# Patient Record
Sex: MALE | Race: WHITE | URBAN - METROPOLITAN AREA
[De-identification: names, ages, dates, MRNs, and addresses within clinical notes are randomized per-mention and may not be internally consistent; named-entity substitution may affect disease eponyms.]

---

## 2018-11-09 ENCOUNTER — OFFICE VISIT (OUTPATIENT)
Dept: INTERNAL MEDICINE CLINIC | Age: 48
End: 2018-11-09

## 2018-11-09 VITALS
HEIGHT: 66 IN | RESPIRATION RATE: 16 BRPM | DIASTOLIC BLOOD PRESSURE: 70 MMHG | SYSTOLIC BLOOD PRESSURE: 112 MMHG | HEART RATE: 91 BPM | WEIGHT: 154 LBS | OXYGEN SATURATION: 98 % | TEMPERATURE: 98 F | BODY MASS INDEX: 24.75 KG/M2

## 2018-11-09 DIAGNOSIS — Z00.00 PHYSICAL EXAM, ANNUAL: Primary | ICD-10-CM

## 2018-11-09 DIAGNOSIS — G56.01 CARPAL TUNNEL SYNDROME OF RIGHT WRIST: ICD-10-CM

## 2018-11-09 DIAGNOSIS — E78.2 MIXED HYPERLIPIDEMIA: ICD-10-CM

## 2018-11-09 DIAGNOSIS — Z23 ENCOUNTER FOR IMMUNIZATION: ICD-10-CM

## 2018-11-09 NOTE — PROGRESS NOTES
HISTORY OF PRESENT ILLNESS Mateo Yeager is a 56 y.o. male. HPI Pt is here to establish care. BP is 112/70 Wt today is 154 lbs Pt states that he works out daily (lifting weights and running) and eats well He does not drink soft drinks, just orange juice in the AM 
Usually eats home cooked meals, and tries to eat well whenever he goes out to eat His wt is in the nl ranges Ordered labs Pt follows with Dr Benita Weber (derm) Last visit was  He has had noncancerous moles taken off in the past 
 
Pt takes a daily multivitamin Pt c/o sweating a lot for quite some time He wonders if this is from drinking too much coffee Pt has used CertainDri but this burns his arms and leaves red marks Discussed that some people sweat more than others Discussed that he could discuss this with a derm Sx get worse with stress, discussed that this is nl Will check labs Pt c/o his R hand feeling like it is going to fall asleep This especially happens when he is typing He also feels this when he wakes up in the AM 
Discussed that this is likely a form of carpal tunnel Discussed using a wrist splint, especially at night Discussed not carrying a heavy bag over his R shoulder PMHx: High cholesterol FMHx: Brother - squamous cell skin cancer Mother - lung cancer (smoker), MS (possibly misdiagnosed) Father - prostate cancer (mid 62s), basal cell, high cholesterol Grandfather - stroke, heart attack ([de-identified], smoker) Grandmother - stroke ([de-identified], smoker) PSHx: bunionectomy Inguinal hernia repair Typanostomy Lynch teeth removal 
 
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 
Colonoscopy: not yet needed PSA: fmhx --father Tdap: 18 Pneumovax: not yet needed Shingrix: not yet needed Flu shot: 2018 Eye exam: [facility] in [place], early , every other year Lipids: ordered EK18, nsr 
 
 There are no active problems to display for this patient. Past Surgical History:  
Procedure Laterality Date  HX BUNIONECTOMY  HX HERNIA REPAIR    
 HX TYMPANOSTOMY  HX WISDOM TEETH EXTRACTION No results found for: WBC, WBCT, WBCPOC, HGB, HGBPOC, HCT, HCTPOC, PLT, PLTPOC, MCV, MCVPOC, HGBEXT, HCTEXT, PLTEXT No results found for: CHOL, CHOLPOCT, HDL, LDL, LDLC, LDLCPOC, LDLCEXT, TRIGL, TGLPOCT, CHHD, CHHDX No results found for: GFRNA, GFRNAPOC, GFRAA, GFRAAPOC, CREA, CREAPOC, BUN, IBUN, BUNPOC, NA, NAPOC, K, KPOCT, CL, CLPOC, CO2, CO2POC, MG, PHOS, ALBEU, PTH, PTHILT, EPO Review of Systems Constitutional: Negative for chills and fever. HENT: Negative for hearing loss and tinnitus. Eyes: Negative for blurred vision and double vision. Respiratory: Negative for shortness of breath and wheezing. Cardiovascular: Negative for chest pain and palpitations. Gastrointestinal: Negative for nausea and vomiting. Genitourinary: Negative for dysuria and frequency. Musculoskeletal: Negative for back pain and falls. Skin: Negative for itching and rash. Neurological: Negative for dizziness, loss of consciousness and headaches. Psychiatric/Behavioral: Negative for depression. The patient is not nervous/anxious. Physical Exam  
Constitutional: He is oriented to person, place, and time. He appears well-developed and well-nourished. No distress. HENT:  
Head: Normocephalic and atraumatic. Right Ear: External ear normal.  
Left Ear: External ear normal.  
Mouth/Throat: Oropharynx is clear and moist. No oropharyngeal exudate. Old scarring in L ear Eyes: Conjunctivae and EOM are normal. Pupils are equal, round, and reactive to light. Right eye exhibits no discharge. Left eye exhibits no discharge. No scleral icterus. Neck: Normal range of motion. Neck supple. No carotid bruits Cardiovascular: Normal rate, regular rhythm, normal heart sounds and intact distal pulses. Exam reveals no gallop and no friction rub. No murmur heard. Pulmonary/Chest: Effort normal and breath sounds normal. No respiratory distress. He has no wheezes. He has no rales. He exhibits no tenderness. Abdominal: Soft. He exhibits no distension and no mass. There is no tenderness. There is no rebound and no guarding. Musculoskeletal: Normal range of motion. He exhibits no edema, tenderness or deformity. Lymphadenopathy:  
  He has no cervical adenopathy. Neurological: He is alert and oriented to person, place, and time. He has normal reflexes. Coordination normal.  
Skin: Skin is warm and dry. No rash noted. He is not diaphoretic. No erythema. No pallor. Psychiatric: He has a normal mood and affect. His behavior is normal.  
 
 
ASSESSMENT and PLAN 
  ICD-10-CM ICD-9-CM 1. Mixed hyperlipidemia Will check lipids today and treat as indicated, pt has no risk factors for CAD at this time E78.2 272.2 2. Physical exam, annual 
 
Nl wt, nl BP, flu shot already done, tdap today, EKG nsr, labs ordered, eye exam UTD, discussed sweating, will check metabolic labs Z00.00 V70.0 LIPID PANEL  
   METABOLIC PANEL, COMPREHENSIVE  
   CBC WITH AUTOMATED DIFF  
   TSH 3RD GENERATION  
   HEMOGLOBIN A1C WITH EAG  
   PSA, DIAGNOSTIC (PROSTATE SPECIFIC AG) EKG, 12 LEAD, INITIAL 3. Carpal tunnel syndrome of right wrist 
 
Provided wrist splint for R hand G56.01 354.0 AMB SUPPLY ORDER Depression screen reviewed and negative. Scribed by Royal Valadez of [address] [place] [address] [address], as dictated by Dr. Leslie Baugh. Current diagnosis and concerns discussed with pt at length. Pt understands risks and benefits or current treatment plan and medications, and accepts the treatment and medication with any possible risks. Pt asks appropriate questions, which were answered. Pt was instructed to call with any concerns or problems. I have reviewed the note documented by the scribe. The services provided are my own. The documentation is accurate This note will not be viewable in [facility].

## 2019-01-09 ENCOUNTER — APPOINTMENT (OUTPATIENT)
Dept: INTERNAL MEDICINE CLINIC | Age: 49
End: 2019-01-09

## 2019-01-10 LAB
ALBUMIN SERPL-MCNC: 4.5 G/DL (ref 3.5–5.5)
ALBUMIN/GLOB SERPL: 1.9 {RATIO} (ref 1.2–2.2)
ALP SERPL-CCNC: 84 IU/L (ref 39–117)
ALT SERPL-CCNC: 21 IU/L (ref 0–44)
AST SERPL-CCNC: 19 IU/L (ref 0–40)
BASOPHILS # BLD AUTO: 0 X10E3/UL (ref 0–0.2)
BASOPHILS NFR BLD AUTO: 0 %
BILIRUB SERPL-MCNC: 0.4 MG/DL (ref 0–1.2)
BUN SERPL-MCNC: 13 MG/DL (ref 6–24)
BUN/CREAT SERPL: 14 (ref 9–20)
CALCIUM SERPL-MCNC: 9.1 MG/DL (ref 8.7–10.2)
CHLORIDE SERPL-SCNC: 103 MMOL/L (ref 96–106)
CHOLEST SERPL-MCNC: 241 MG/DL (ref 100–199)
CO2 SERPL-SCNC: 24 MMOL/L (ref 20–29)
CREAT SERPL-MCNC: 0.91 MG/DL (ref 0.76–1.27)
EOSINOPHIL # BLD AUTO: 0.1 X10E3/UL (ref 0–0.4)
EOSINOPHIL NFR BLD AUTO: 1 %
ERYTHROCYTE [DISTWIDTH] IN BLOOD BY AUTOMATED COUNT: 12.5 % (ref 12.3–15.4)
EST. AVERAGE GLUCOSE BLD GHB EST-MCNC: 108 MG/DL
GLOBULIN SER CALC-MCNC: 2.4 G/DL (ref 1.5–4.5)
GLUCOSE SERPL-MCNC: 94 MG/DL (ref 65–99)
HBA1C MFR BLD: 5.4 % (ref 4.8–5.6)
HCT VFR BLD AUTO: 44.6 % (ref 37.5–51)
HDLC SERPL-MCNC: 63 MG/DL
HGB BLD-MCNC: 15.2 G/DL (ref 13–17.7)
IMM GRANULOCYTES # BLD AUTO: 0 X10E3/UL (ref 0–0.1)
IMM GRANULOCYTES NFR BLD AUTO: 0 %
LDLC SERPL CALC-MCNC: 156 MG/DL (ref 0–99)
LYMPHOCYTES # BLD AUTO: 1.9 X10E3/UL (ref 0.7–3.1)
LYMPHOCYTES NFR BLD AUTO: 25 %
MCH RBC QN AUTO: 32.7 PG (ref 26.6–33)
MCHC RBC AUTO-ENTMCNC: 34.1 G/DL (ref 31.5–35.7)
MCV RBC AUTO: 96 FL (ref 79–97)
MONOCYTES # BLD AUTO: 0.5 X10E3/UL (ref 0.1–0.9)
MONOCYTES NFR BLD AUTO: 7 %
NEUTROPHILS # BLD AUTO: 5 X10E3/UL (ref 1.4–7)
NEUTROPHILS NFR BLD AUTO: 67 %
PLATELET # BLD AUTO: 291 X10E3/UL (ref 150–379)
POTASSIUM SERPL-SCNC: 4.5 MMOL/L (ref 3.5–5.2)
PROT SERPL-MCNC: 6.9 G/DL (ref 6–8.5)
PSA SERPL-MCNC: 0.6 NG/ML (ref 0–4)
RBC # BLD AUTO: 4.65 X10E6/UL (ref 4.14–5.8)
SODIUM SERPL-SCNC: 143 MMOL/L (ref 134–144)
TRIGL SERPL-MCNC: 112 MG/DL (ref 0–149)
TSH SERPL DL<=0.005 MIU/L-ACNC: 1.31 UIU/ML (ref 0.45–4.5)
VLDLC SERPL CALC-MCNC: 22 MG/DL (ref 5–40)
WBC # BLD AUTO: 7.5 X10E3/UL (ref 3.4–10.8)

## 2019-01-29 ENCOUNTER — TELEPHONE (OUTPATIENT)
Dept: INTERNAL MEDICINE CLINIC | Age: 49
End: 2019-01-29

## 2019-01-29 NOTE — TELEPHONE ENCOUNTER
Pt requesting a return call to discuss test results       Message received & copied from [facility] after closing on 1/28/19

## 2019-01-30 ENCOUNTER — TELEPHONE (OUTPATIENT)
Dept: INTERNAL MEDICINE CLINIC | Age: 49
End: 2019-01-30

## 2019-01-30 NOTE — TELEPHONE ENCOUNTER
Pt is requesting a call back. Pt stated this is his second message. Best contact number is [phone].        Message received & copied from [facility] after closing on 1/29/19

## 2019-01-30 NOTE — TELEPHONE ENCOUNTER
Called, Spoke with caller. Two pt identifiers confirmed. Pt is concerned of cholesterol levels. Gave caller cholesterol results. Pt states he exercise and don't eat a lot of greasy or fatty foods. Pt states Dr. Sydney Mack told him \"if his LDL is below 199, he is nothing to worry around. \"   Advised Pt to listen to Dr. Sydney Mack advice from [facility]. Pt verbalized understanding of information discussed w/ no further questions at this time.

## 2019-01-30 NOTE — TELEPHONE ENCOUNTER
Called, spoke with Pantera Wilson (HIPAA)  Two pt identifiers confirmed. Phu Shaw states pt was worried about his cholesterol level. Phu Shaw informed of Pt's cholesterol numbers from his lab work. Phu Shaw expressed she would like Dr. Staci Tsang to call the pt. Informed that Dr. Staci Tsang will be in with Patients all day and best to leave a message with the nurse. Joan Hatley that Dr. Staci Tsang advises Pt can control his cholesterol with diet and exercise. Phu Shaw verbalized understanding of information discussed w/ no further questions at this time.

## 2019-10-03 ENCOUNTER — TELEPHONE (OUTPATIENT)
Dept: INTERNAL MEDICINE CLINIC | Age: 49
End: 2019-10-03

## 2019-10-03 NOTE — TELEPHONE ENCOUNTER
Received call from [employer] ([facility]). Two pt identifiers confirmed. Rubens Vanna states pt got hit in the head with a ball from playing dodgeball. Rubens Prabahkar states that pt c/o of dizziness the next morning while doing crunches. Rubens Prabhakar states that pt get dizzy from laying down to sitting up. Rubens Prabhakar asking for an appt for pt on 10/8/19. Rubens Prabhakar offered and accepted appt for 10/8/19 1430. Rubens Prabhakar verbalized understanding of information discussed w/ no further questions at this time.

## 2019-10-08 ENCOUNTER — OFFICE VISIT (OUTPATIENT)
Dept: INTERNAL MEDICINE CLINIC | Age: 49
End: 2019-10-08

## 2019-10-08 VITALS
HEIGHT: 66 IN | WEIGHT: 153 LBS | DIASTOLIC BLOOD PRESSURE: 77 MMHG | BODY MASS INDEX: 24.59 KG/M2 | HEART RATE: 83 BPM | SYSTOLIC BLOOD PRESSURE: 129 MMHG | RESPIRATION RATE: 16 BRPM | OXYGEN SATURATION: 99 % | TEMPERATURE: 97.9 F

## 2019-10-08 DIAGNOSIS — E78.2 MIXED HYPERLIPIDEMIA: ICD-10-CM

## 2019-10-08 DIAGNOSIS — H81.13 BENIGN PAROXYSMAL POSITIONAL VERTIGO DUE TO BILATERAL VESTIBULAR DISORDER: Primary | ICD-10-CM

## 2019-10-08 RX ORDER — MECLIZINE HYDROCHLORIDE 25 MG/1
25 TABLET ORAL
Qty: 20 TAB | Refills: 0 | Status: SHIPPED | OUTPATIENT
Start: 2019-10-08 | End: 2019-10-18

## 2019-10-08 NOTE — PROGRESS NOTES
HISTORY OF PRESENT ILLNESS  Kailee Parrish is a 53 y.o. male. HPI   Last here 11/9/18.  Pt is here for routine/acute care.      BP is 112/70     Wt today is 153 lbs-- stable x lov   Pt  works out daily (lifting weights and running) and eats well  His wt is in the nl ranges     Reviewed labs 1/19      Pt follows with Dr Liza Carmen (derm)  Last visit was 11/18  He has had noncancerous moles taken off in the past     Pt takes a daily multivitamin       Pt c/o dizziness and possible vertigo x a couple of days   States he was at gym and after doing a crunch he laid back down and felt like everything was spinning   Does not have these sxs when he rolls over in bed   Has these sxs today when he lays back   Does not have these sxs when turning his head side to side   Also has these sxs when he lifts his head up   Pt states this last a couple of seconds and then he is able to resume   Discussed this is likely positional vertigo and is benign   Took tylenol otc   Pt states he got hit in the head while playing dodgeball with a rubber ball, continued playing and did not pass out  Really no issues with this  Discussed vertigo can reoccur and is highly variable   Pt wonders if it is due to ear infections when he was young, discussed not from this,  it is related to displaced crystals in inner ear   Will give meclizine to use prn   Will give info for Dr Kenyetta Webber (ent) if sxs don't resolve for epley maneuver   discussed avoiding aggravating position until sxs resolve      discussed congestion can make it worse   Pt states his head has felt stuffy and full recently   Discussed flonase otc can help     Pt wonders how to lower LDL   Discussed this wt is in nl ranges   Discussed this is likely a genetic elevation   Discussed best thing to do is prevent wt gain   Discussed HDL is high and that is protective   Discussed moderation with eating unhealthy foods      PREVENTIVE:  Colonoscopy: not yet needed  PSA: fmhx --father, 1/19 0.6   Tdap: 18   Pneumovax: not yet needed  Shingrix: not yet needed  Flu shot: 10/19   [facility] exam: [facility], early , every other year  Lipids:  156  A1c:  5.4   EK18, nsr    There are no active problems to display for this patient. Past Surgical History:   Procedure Laterality Date    HX BUNIONECTOMY      HX HERNIA REPAIR      HX TYMPANOSTOMY      HX WISDOM TEETH EXTRACTION        Lab Results   Component Value Date/Time    WBC 7.5 2019 08:15 AM    HGB 15.2 2019 08:15 AM    HCT 44.6 2019 08:15 AM    PLATELET 232  08:15 AM    MCV 96 2019 08:15 AM     Lab Results   Component Value Date/Time    Cholesterol, total 241 (H) 2019 08:15 AM    HDL Cholesterol 63 2019 08:15 AM    LDL, calculated 156 (H) 2019 08:15 AM    Triglyceride 112 2019 08:15 AM     Lab Results   Component Value Date/Time    GFR est non- 2019 08:15 AM    GFR est  2019 08:15 AM    Creatinine 0.91 2019 08:15 AM    BUN 13 2019 08:15 AM    Sodium 143 2019 08:15 AM    Potassium 4.5 2019 08:15 AM    Chloride 103 2019 08:15 AM    CO2 24 2019 08:15 AM        Review of Systems   Respiratory: Negative for shortness of breath. Cardiovascular: Negative for chest pain. Gastrointestinal: Negative for nausea and vomiting. Neurological: Positive for dizziness. Physical Exam   Constitutional: He is oriented to person, place, and time. He appears well-developed and well-nourished. No distress. HENT:   Head: Normocephalic and atraumatic. Right Ear: External ear normal.   Left Ear: External ear normal.   Mouth/Throat: Oropharynx is clear and moist. No oropharyngeal exudate. Old scarring L ear    Eyes: Conjunctivae and EOM are normal. Right eye exhibits no discharge. Left eye exhibits no discharge. Neck: Normal range of motion. Neck supple.    No carotid bruits    Cardiovascular: Normal rate, regular rhythm and normal heart sounds. Exam reveals no gallop and no friction rub. No murmur heard. Pulmonary/Chest: Effort normal and breath sounds normal. No respiratory distress. He has no wheezes. He has no rales. He exhibits no tenderness. Musculoskeletal: Normal range of motion. He exhibits no edema, tenderness or deformity. 5/5 strength BLUE    Lymphadenopathy:     He has no cervical adenopathy. Neurological: He is alert and oriented to person, place, and time. He has normal reflexes. Coordination normal.   Cranial nerves 2-12 grossly intact  Finger to nose normal BL  Rapid alternating hand movements normal BL    Skin: Skin is warm and dry. No rash noted. He is not diaphoretic. No erythema. No pallor. Psychiatric: He has a normal mood and affect. His behavior is normal.       ASSESSMENT and PLAN    ICD-10-CM ICD-9-CM    1. Benign paroxysmal positional vertigo due to bilateral vestibular disorder              Benign positional pt with transient bouts of vertigo provoked with head movement overall improving nl neuro exam provided meclizine to use prn gave info for dr Krupa Aguila if sxs do not improve  H81.13 386.11 REFERRAL TO ENT-OTOLARYNGOLOGY   2. Mixed hyperlipidemia                Diet controlled has nl wt continue with routine exercises  E78.2 272.2             Depression screen reviewed and negative. Scribed by Shantal Cartagena of [address] [place] [address] [address], as dictated by Dr. Alissa Palma. Current diagnosis and concerns discussed with pt at length. Pt understands risks and benefits or current treatment plan and medications, and accepts the treatment and medication with any possible risks. Pt asks appropriate questions, which were answered. Pt was instructed to call with any concerns or problems. I have reviewed the note documented by the scribe. The services provided are my own.   The documentation is accurate

## 2019-11-25 ENCOUNTER — OFFICE VISIT (OUTPATIENT)
Dept: INTERNAL MEDICINE CLINIC | Age: 49
End: 2019-11-25

## 2019-11-25 VITALS
RESPIRATION RATE: 16 BRPM | TEMPERATURE: 97.5 F | HEART RATE: 64 BPM | WEIGHT: 153 LBS | SYSTOLIC BLOOD PRESSURE: 137 MMHG | BODY MASS INDEX: 24.59 KG/M2 | DIASTOLIC BLOOD PRESSURE: 78 MMHG | HEIGHT: 66 IN

## 2019-11-25 DIAGNOSIS — Z00.00 PHYSICAL EXAM, ANNUAL: Primary | ICD-10-CM

## 2019-11-25 PROBLEM — C43.59 MALIGNANT MELANOMA OF TORSO EXCLUDING BREAST (HCC): Status: ACTIVE | Noted: 2019-11-25

## 2019-11-25 NOTE — PROGRESS NOTES
HISTORY OF PRESENT ILLNESS  Yanely Dumont is a 53 y.o. male. HPI   Last here 10/8/19 Pt is here for routine care.      BP is 137/78      Wt today is 153 lbs-- stable x lov   Pt  works out daily (lifting weights and running) and eats well  His wt is in the nl ranges     Will get labs today      Pt follows with Dr Maria C Polo (derm)  Last visit was 10/31/19   Pt states they found melanoma, states brislow scale was 7.39, had this removed and had incision surg 2 weeks ago   He will now f/u every 6 mos        Pt takes a daily multivitamin        Lov, Pt c/o dizziness and possible vertigo x a couple of days   States this has mostly resolved         PREVENTIVE:  Colonoscopy: not yet needed  PSA: fmhx --father,  0.6   Tdap: 18   Pneumovax: not yet needed  Shingrix: not yet needed  Flu shot: 10/19   Eye exam: [facility] in [place], early , every other year, reminded   Lipids:  156  A1c:  5.4   EK18, nsr    There are no active problems to display for this patient.       Past Surgical History:   Procedure Laterality Date    HX BUNIONECTOMY      HX HERNIA REPAIR      HX TYMPANOSTOMY      HX WISDOM TEETH EXTRACTION        Lab Results   Component Value Date/Time    WBC 7.5 2019 08:15 AM    HGB 15.2 2019 08:15 AM    HCT 44.6 2019 08:15 AM    PLATELET 674  08:15 AM    MCV 96 2019 08:15 AM     Lab Results   Component Value Date/Time    Cholesterol, total 241 (H) 2019 08:15 AM    HDL Cholesterol 63 2019 08:15 AM    LDL, calculated 156 (H) 2019 08:15 AM    Triglyceride 112 2019 08:15 AM     Lab Results   Component Value Date/Time    GFR est non- 2019 08:15 AM    GFR est  2019 08:15 AM    Creatinine 0.91 2019 08:15 AM    BUN 13 2019 08:15 AM    Sodium 143 2019 08:15 AM    Potassium 4.5 2019 08:15 AM    Chloride 103 2019 08:15 AM    CO2 24 2019 08:15 AM        Review of Systems Respiratory: Negative for shortness of breath. Cardiovascular: Negative for chest pain. Physical Exam  Constitutional:       General: He is not in acute distress. Appearance: He is well-developed. He is not diaphoretic. HENT:      Head: Normocephalic and atraumatic. Right Ear: Tympanic membrane, ear canal and external ear normal.      Left Ear: Tympanic membrane, ear canal and external ear normal.      Mouth/Throat:      Mouth: Mucous membranes are moist.      Pharynx: Oropharynx is clear. No oropharyngeal exudate or posterior oropharyngeal erythema. Eyes:      General: No scleral icterus. Right eye: No discharge. Left eye: No discharge. Conjunctiva/sclera: Conjunctivae normal.   Neck:      Musculoskeletal: Normal range of motion and neck supple. Vascular: No carotid bruit. Cardiovascular:      Rate and Rhythm: Normal rate and regular rhythm. Heart sounds: Normal heart sounds. No murmur. No friction rub. No gallop. Pulmonary:      Effort: Pulmonary effort is normal. No respiratory distress. Breath sounds: Normal breath sounds. No wheezing or rales. Chest:      Chest wall: No tenderness. Abdominal:      General: There is no distension. Palpations: There is no mass. Tenderness: There is no tenderness. There is no guarding or rebound. Hernia: No hernia is present. Comments: Well healed incision site    Musculoskeletal: Normal range of motion. General: No tenderness or deformity. Lymphadenopathy:      Cervical: No cervical adenopathy. Skin:     General: Skin is warm and dry. Coloration: Skin is not pale. Findings: No erythema or rash. Neurological:      Mental Status: He is alert and oriented to person, place, and time. Coordination: Coordination normal.      Deep Tendon Reflexes: Reflexes are normal and symmetric.    Psychiatric:         Behavior: Behavior normal.         ASSESSMENT and PLAN    ICD-10-CM ICD-9-CM    1. Physical exam, annual                  Nl wt nl bp flu shot utd eye exam due labs ordered colo at age 49 no fmhx of colon cancer or polyps  Z00.00 V70.0 LIPID PANEL      METABOLIC PANEL, COMPREHENSIVE      CBC W/O DIFF      TSH 3RD GENERATION      HEMOGLOBIN A1C WITH EAG      PSA, DIAGNOSTIC (PROSTATE SPECIFIC AG)        Depression screen reviewed and negative. Scribed by Gracie Shepard of [facility], as dictated by Dr. Meg Lamas. Current diagnosis and concerns discussed with pt at length. Pt understands risks and benefits or current treatment plan and medications, and accepts the treatment and medication with any possible risks. Pt asks appropriate questions, which were answered. Pt was instructed to call with any concerns or problems. I have reviewed the note documented by the scribe. The services provided are my own.   The documentation is accurate

## 2021-03-08 NOTE — PROGRESS NOTES
HISTORY OF PRESENT ILLNESS  Magaly Colindres is a 53 y.o. male.   HPI     Last here 19 Pt is here for routine care.      BP today is 150/82, repeat improved     Wt is 156 lbs - up 3 lbs x lov     Pt  works out daily (lifting weights and running) and eats well  His wt is in the nl ranges     Reminded pt to complete labs - reordered     Pt follows with Dr Yolanda Quintana (derm)  Last visit was  - had mole removed, has f/u   Pt states they found melanoma, states brislow scale was 7.39, had this removed and had incision surg   He will now f/u every 6 mos      Pt takes a daily multivitamin      PREVENTIVE:  Colonoscopy: due at age 49, will give info today   PSA: fmhx --father,  0.6   Tdap: 18   Pneumovax: not yet needed  Shingrix: not yet needed  Flu shot: 10/20  Eye exam: [facility] in [place], early , due reminded   Lipids:  156  A1c:  5.4   EK18, nsr    Patient Active Problem List    Diagnosis Date Noted    Malignant melanoma of torso excluding breast ([address] 2019       Past Surgical History:   Procedure Laterality Date    HX BUNIONECTOMY      HX HERNIA REPAIR      HX TYMPANOSTOMY      HX WISDOM TEETH EXTRACTION        Lab Results   Component Value Date/Time    WBC 7.5 2019 08:15 AM    HGB 15.2 2019 08:15 AM    HCT 44.6 2019 08:15 AM    PLATELET 433  08:15 AM    MCV 96 2019 08:15 AM     Lab Results   Component Value Date/Time    Cholesterol, total 241 (H) 2019 08:15 AM    HDL Cholesterol 63 2019 08:15 AM    LDL, calculated 156 (H) 2019 08:15 AM    Triglyceride 112 2019 08:15 AM     Lab Results   Component Value Date/Time    GFR est non- 2019 08:15 AM    GFR est  2019 08:15 AM    Creatinine 0.91 2019 08:15 AM    BUN 13 2019 08:15 AM    Sodium 143 2019 08:15 AM    Potassium 4.5 2019 08:15 AM    Chloride 103 2019 08:15 AM    CO2 24 2019 08:15 AM        Review of Systems   Respiratory: Negative for shortness of breath. Cardiovascular: Negative for chest pain. Physical Exam  Constitutional:       General: He is not in acute distress. Appearance: Normal appearance. He is not ill-appearing, toxic-appearing or diaphoretic. HENT:      Head: Normocephalic and atraumatic. Right Ear: External ear normal.      Left Ear: External ear normal.   Eyes:      General:         Right eye: No discharge. Left eye: No discharge. Conjunctiva/sclera: Conjunctivae normal.      Pupils: Pupils are equal, round, and reactive to light. Neck:      Musculoskeletal: Normal range of motion and neck supple. Vascular: No carotid bruit. Cardiovascular:      Rate and Rhythm: Normal rate and regular rhythm. Pulses: Normal pulses. Heart sounds: Normal heart sounds. No murmur. No friction rub. No gallop. Pulmonary:      Effort: No respiratory distress. Breath sounds: Normal breath sounds. No wheezing or rales. Chest:      Chest wall: No tenderness. Abdominal:      General: Abdomen is flat. There is no distension. Palpations: Abdomen is soft. There is no mass. Tenderness: There is no abdominal tenderness. There is no guarding or rebound. Hernia: No hernia is present. Musculoskeletal: Normal range of motion. Right lower leg: No edema. Left lower leg: No edema. Skin:     General: Skin is warm and dry. Neurological:      Mental Status: He is alert and oriented to person, place, and time. Mental status is at baseline. Coordination: Coordination normal.      Gait: Gait normal.   Psychiatric:         Mood and Affect: Mood normal.         Behavior: Behavior normal.         ASSESSMENT and PLAN    ICD-10-CM ICD-9-CM    1.  Physical exam         Blood pressure initially elevated repeat showed adequate control    Weight okay    Due for labs ordered    Needs to get eye exam    Colonoscopy at age 49 provide referral       Z00.00 V70.9 REFERRAL TO GASTROENTEROLOGY      METABOLIC PANEL, COMPREHENSIVE      CBC W/O DIFF      LIPID PANEL      HEMOGLOBIN A1C WITH EAG      TSH 3RD GENERATION      HEPATITIS C AB      PSA, DIAGNOSTIC (PROSTATE SPECIFIC AG)   2. Malignant melanoma of torso excluding breast [facility])       Up-to-date with dermatology C43.59 172.5            Scribed by Janeth Henson of [address] [place] [address] [address], as dictated by Dr. Hiren Nguyễn. Current diagnosis and concerns discussed with pt at length. Pt understands risks and benefits or current treatment plan and medications, and accepts the treatment and medication with any possible risks. Pt asks appropriate questions, which were answered. Pt was instructed to call with any concerns or problems. I have reviewed the note documented by the scribe. The services provided are my own.   The documentation is accurate

## 2021-03-10 ENCOUNTER — OFFICE VISIT (OUTPATIENT)
Dept: INTERNAL MEDICINE CLINIC | Age: 51
End: 2021-03-10
Payer: COMMERCIAL

## 2021-03-10 VITALS
BODY MASS INDEX: 25.2 KG/M2 | HEART RATE: 65 BPM | HEIGHT: 66 IN | RESPIRATION RATE: 16 BRPM | SYSTOLIC BLOOD PRESSURE: 138 MMHG | OXYGEN SATURATION: 97 % | DIASTOLIC BLOOD PRESSURE: 71 MMHG | TEMPERATURE: 97.6 F | WEIGHT: 156.8 LBS

## 2021-03-10 DIAGNOSIS — C43.59 MALIGNANT MELANOMA OF TORSO EXCLUDING BREAST (HCC): ICD-10-CM

## 2021-03-10 DIAGNOSIS — Z00.00 PHYSICAL EXAM: Primary | ICD-10-CM

## 2021-03-10 PROCEDURE — 99396 PREV VISIT EST AGE 40-64: CPT | Performed by: INTERNAL MEDICINE

## 2021-10-29 LAB — SARS-COV-2: NOT DETECTED

## 2022-03-02 ENCOUNTER — TELEPHONE (OUTPATIENT)
Dept: INTERNAL MEDICINE CLINIC | Age: 52
End: 2022-03-02

## 2022-03-02 DIAGNOSIS — Z12.11 SCREENING FOR COLON CANCER: Primary | ICD-10-CM

## 2022-03-02 NOTE — TELEPHONE ENCOUNTER
----- Message from Wesley Mariamayuniel sent at 3/1/2022  4:25 PM EST -----  Subject: Referral Request    QUESTIONS   Reason for referral request? Patient is requesting a referral for a   colonoscopy. Please reach out to the patient at you rearliest convenience. Thank you   Has the physician seen you for this condition before? No   Preferred Specialist (if applicable)? Do you already have an appointment scheduled? No  Additional Information for Provider?   ---------------------------------------------------------------------------  --------------  CALL BACK INFO  What is the best way for the office to contact you? OK to leave message on   voicemail  Preferred Call Back Phone Number?  [phone]

## 2022-03-02 NOTE — TELEPHONE ENCOUNTER
----- Message from Marifer 89 sent at 3/1/2022  4:41 PM EST -----  Subject: Message to Provider    QUESTIONS  Information for Provider? Patient needs to have his labs done before his   appt on 3/15. He called after office was closed so a WT was not possible. Please reach out to patient to get labs scheduled. ---------------------------------------------------------------------------  --------------  Linda Johnson INFO  What is the best way for the office to contact you? OK to leave message on   voicemail  Preferred Call Back Phone Number? [phone]  ---------------------------------------------------------------------------  --------------  SCRIPT ANSWERS  Relationship to Patient?  Self

## 2022-03-09 ENCOUNTER — APPOINTMENT (OUTPATIENT)
Dept: INTERNAL MEDICINE CLINIC | Age: 52
End: 2022-03-09

## 2022-03-10 LAB
ALBUMIN SERPL-MCNC: 4.7 G/DL (ref 4–5)
ALBUMIN/GLOB SERPL: 2.6 {RATIO} (ref 1.2–2.2)
ALP SERPL-CCNC: 69 IU/L (ref 44–121)
ALT SERPL-CCNC: 20 IU/L (ref 0–44)
AST SERPL-CCNC: 22 IU/L (ref 0–40)
BILIRUB SERPL-MCNC: 0.5 MG/DL (ref 0–1.2)
BUN SERPL-MCNC: 14 MG/DL (ref 6–24)
BUN/CREAT SERPL: 16 (ref 9–20)
CALCIUM SERPL-MCNC: 8.9 MG/DL (ref 8.7–10.2)
CHLORIDE SERPL-SCNC: 99 MMOL/L (ref 96–106)
CHOLEST SERPL-MCNC: 247 MG/DL (ref 100–199)
CO2 SERPL-SCNC: 23 MMOL/L (ref 20–29)
CREAT SERPL-MCNC: 0.9 MG/DL (ref 0.76–1.27)
EGFR: 104 ML/MIN/1.73
ERYTHROCYTE [DISTWIDTH] IN BLOOD BY AUTOMATED COUNT: 11.9 % (ref 11.6–15.4)
EST. AVERAGE GLUCOSE BLD GHB EST-MCNC: 111 MG/DL
GLOBULIN SER CALC-MCNC: 1.8 G/DL (ref 1.5–4.5)
GLUCOSE SERPL-MCNC: 90 MG/DL (ref 65–99)
HBA1C MFR BLD: 5.5 % (ref 4.8–5.6)
HCT VFR BLD AUTO: 44.7 % (ref 37.5–51)
HCV AB S/CO SERPL IA: <0.1 S/CO RATIO (ref 0–0.9)
HDLC SERPL-MCNC: 63 MG/DL
HGB BLD-MCNC: 15.1 G/DL (ref 13–17.7)
LDLC SERPL CALC-MCNC: 163 MG/DL (ref 0–99)
MCH RBC QN AUTO: 32.6 PG (ref 26.6–33)
MCHC RBC AUTO-ENTMCNC: 33.8 G/DL (ref 31.5–35.7)
MCV RBC AUTO: 97 FL (ref 79–97)
PLATELET # BLD AUTO: 293 X10E3/UL (ref 150–450)
POTASSIUM SERPL-SCNC: 4.5 MMOL/L (ref 3.5–5.2)
PROT SERPL-MCNC: 6.5 G/DL (ref 6–8.5)
PSA SERPL-MCNC: 0.6 NG/ML (ref 0–4)
RBC # BLD AUTO: 4.63 X10E6/UL (ref 4.14–5.8)
SODIUM SERPL-SCNC: 135 MMOL/L (ref 134–144)
TRIGL SERPL-MCNC: 120 MG/DL (ref 0–149)
TSH SERPL DL<=0.005 MIU/L-ACNC: 1.64 UIU/ML (ref 0.45–4.5)
VLDLC SERPL CALC-MCNC: 21 MG/DL (ref 5–40)
WBC # BLD AUTO: 5.2 X10E3/UL (ref 3.4–10.8)

## 2022-03-15 ENCOUNTER — OFFICE VISIT (OUTPATIENT)
Dept: INTERNAL MEDICINE CLINIC | Age: 52
End: 2022-03-15
Payer: COMMERCIAL

## 2022-03-15 VITALS
HEART RATE: 84 BPM | TEMPERATURE: 97.8 F | BODY MASS INDEX: 25.71 KG/M2 | RESPIRATION RATE: 16 BRPM | DIASTOLIC BLOOD PRESSURE: 75 MMHG | WEIGHT: 160 LBS | SYSTOLIC BLOOD PRESSURE: 136 MMHG | HEIGHT: 66 IN | OXYGEN SATURATION: 100 %

## 2022-03-15 DIAGNOSIS — Z00.00 PHYSICAL EXAM: Primary | ICD-10-CM

## 2022-03-15 DIAGNOSIS — E78.2 MIXED HYPERLIPIDEMIA: ICD-10-CM

## 2022-03-15 PROCEDURE — 99396 PREV VISIT EST AGE 40-64: CPT | Performed by: INTERNAL MEDICINE

## 2022-03-19 PROBLEM — C43.59 MALIGNANT MELANOMA OF TORSO EXCLUDING BREAST (HCC): Status: ACTIVE | Noted: 2019-11-25

## 2023-03-16 ENCOUNTER — TELEPHONE (OUTPATIENT)
Dept: INTERNAL MEDICINE CLINIC | Age: 53
End: 2023-03-16

## 2023-03-16 DIAGNOSIS — Z00.00 PHYSICAL EXAM: Primary | ICD-10-CM

## 2023-03-16 DIAGNOSIS — E78.2 MIXED HYPERLIPIDEMIA: ICD-10-CM

## 2023-03-16 NOTE — TELEPHONE ENCOUNTER
Patient cancelled his appointment on 3/20/23 during reminder call. Patient will be out of town due for work until 3/23/23. Patient has been rescheduled to 4/12/23. Patient is requesting a new order for labs to be completed prior to his appointment. Please contact patient when lab orders are ready for .

## 2023-03-16 NOTE — TELEPHONE ENCOUNTER
Called, spoke to pt  Received two pt identifiers  Informed pt labs ordered  Pt verbalizes understanding of the instructions and has no further questions at this time.

## 2023-06-14 ASSESSMENT — ENCOUNTER SYMPTOMS: SHORTNESS OF BREATH: 0

## 2023-06-30 ENCOUNTER — NURSE ONLY (OUTPATIENT)
Age: 53
End: 2023-06-30

## 2023-06-30 DIAGNOSIS — E78.2 MIXED HYPERLIPIDEMIA: ICD-10-CM

## 2023-06-30 DIAGNOSIS — R73.01 IFG (IMPAIRED FASTING GLUCOSE): ICD-10-CM

## 2023-06-30 DIAGNOSIS — Z12.11 ENCOUNTER FOR SCREENING FOR MALIGNANT NEOPLASM OF COLON: ICD-10-CM

## 2023-06-30 DIAGNOSIS — Z12.5 SCREENING PSA (PROSTATE SPECIFIC ANTIGEN): ICD-10-CM

## 2023-06-30 DIAGNOSIS — Z00.00 ENCOUNTER FOR GENERAL ADULT MEDICAL EXAMINATION WITHOUT ABNORMAL FINDINGS: Primary | ICD-10-CM

## 2023-06-30 DIAGNOSIS — Z00.00 ENCOUNTER FOR GENERAL ADULT MEDICAL EXAMINATION WITHOUT ABNORMAL FINDINGS: ICD-10-CM

## 2023-07-01 LAB
ALBUMIN SERPL-MCNC: 4.6 G/DL (ref 3.8–4.9)
ALBUMIN/GLOB SERPL: 2 {RATIO} (ref 1.2–2.2)
ALP SERPL-CCNC: 75 IU/L (ref 44–121)
ALT SERPL-CCNC: 22 IU/L (ref 0–44)
AST SERPL-CCNC: 25 IU/L (ref 0–40)
BILIRUB SERPL-MCNC: 0.6 MG/DL (ref 0–1.2)
BUN SERPL-MCNC: 16 MG/DL (ref 6–24)
BUN/CREAT SERPL: 17 (ref 9–20)
CALCIUM SERPL-MCNC: 9.5 MG/DL (ref 8.7–10.2)
CHLORIDE SERPL-SCNC: 99 MMOL/L (ref 96–106)
CHOLEST SERPL-MCNC: 283 MG/DL (ref 100–199)
CO2 SERPL-SCNC: 23 MMOL/L (ref 20–29)
CREAT SERPL-MCNC: 0.96 MG/DL (ref 0.76–1.27)
EGFRCR SERPLBLD CKD-EPI 2021: 96 ML/MIN/1.73
ERYTHROCYTE [DISTWIDTH] IN BLOOD BY AUTOMATED COUNT: 11.7 % (ref 11.6–15.4)
GLOBULIN SER CALC-MCNC: 2.3 G/DL (ref 1.5–4.5)
GLUCOSE SERPL-MCNC: 101 MG/DL (ref 70–99)
HBA1C MFR BLD: 5.8 % (ref 4.8–5.6)
HCT VFR BLD AUTO: 44.9 % (ref 37.5–51)
HDLC SERPL-MCNC: 64 MG/DL
HGB BLD-MCNC: 15.7 G/DL (ref 13–17.7)
LDLC SERPL CALC-MCNC: 158 MG/DL (ref 0–99)
MCH RBC QN AUTO: 32.7 PG (ref 26.6–33)
MCHC RBC AUTO-ENTMCNC: 35 G/DL (ref 31.5–35.7)
MCV RBC AUTO: 94 FL (ref 79–97)
PLATELET # BLD AUTO: 322 X10E3/UL (ref 150–450)
POTASSIUM SERPL-SCNC: 4.3 MMOL/L (ref 3.5–5.2)
PROT SERPL-MCNC: 6.9 G/DL (ref 6–8.5)
PSA SERPL-MCNC: 0.9 NG/ML (ref 0–4)
RBC # BLD AUTO: 4.8 X10E6/UL (ref 4.14–5.8)
SODIUM SERPL-SCNC: 139 MMOL/L (ref 134–144)
TRIGL SERPL-MCNC: 328 MG/DL (ref 0–149)
TSH SERPL DL<=0.005 MIU/L-ACNC: 1.83 UIU/ML (ref 0.45–4.5)
VLDLC SERPL CALC-MCNC: 61 MG/DL (ref 5–40)
WBC # BLD AUTO: 7 X10E3/UL (ref 3.4–10.8)

## 2023-07-11 ENCOUNTER — OFFICE VISIT (OUTPATIENT)
Age: 53
End: 2023-07-11
Payer: COMMERCIAL

## 2023-07-11 VITALS
WEIGHT: 164 LBS | DIASTOLIC BLOOD PRESSURE: 74 MMHG | HEIGHT: 66 IN | HEART RATE: 104 BPM | RESPIRATION RATE: 18 BRPM | SYSTOLIC BLOOD PRESSURE: 115 MMHG | OXYGEN SATURATION: 97 % | BODY MASS INDEX: 26.36 KG/M2 | TEMPERATURE: 98.2 F

## 2023-07-11 DIAGNOSIS — C43.59 MALIGNANT MELANOMA OF OTHER PART OF TRUNK (HCC): ICD-10-CM

## 2023-07-11 DIAGNOSIS — Z00.00 PHYSICAL EXAM: ICD-10-CM

## 2023-07-11 DIAGNOSIS — E78.00 PURE HYPERCHOLESTEROLEMIA: Primary | ICD-10-CM

## 2023-07-11 PROCEDURE — 99396 PREV VISIT EST AGE 40-64: CPT | Performed by: INTERNAL MEDICINE

## 2023-07-11 SDOH — ECONOMIC STABILITY: INCOME INSECURITY: HOW HARD IS IT FOR YOU TO PAY FOR THE VERY BASICS LIKE FOOD, HOUSING, MEDICAL CARE, AND HEATING?: NOT HARD AT ALL

## 2023-07-11 SDOH — ECONOMIC STABILITY: FOOD INSECURITY: WITHIN THE PAST 12 MONTHS, THE FOOD YOU BOUGHT JUST DIDN'T LAST AND YOU DIDN'T HAVE MONEY TO GET MORE.: NEVER TRUE

## 2023-07-11 SDOH — ECONOMIC STABILITY: FOOD INSECURITY: WITHIN THE PAST 12 MONTHS, YOU WORRIED THAT YOUR FOOD WOULD RUN OUT BEFORE YOU GOT MONEY TO BUY MORE.: NEVER TRUE

## 2023-07-11 SDOH — ECONOMIC STABILITY: HOUSING INSECURITY
IN THE LAST 12 MONTHS, WAS THERE A TIME WHEN YOU DID NOT HAVE A STEADY PLACE TO SLEEP OR SLEPT IN A SHELTER (INCLUDING NOW)?: NO

## 2023-07-11 ASSESSMENT — PATIENT HEALTH QUESTIONNAIRE - PHQ9
1. LITTLE INTEREST OR PLEASURE IN DOING THINGS: 0
SUM OF ALL RESPONSES TO PHQ QUESTIONS 1-9: 0
SUM OF ALL RESPONSES TO PHQ9 QUESTIONS 1 & 2: 0
SUM OF ALL RESPONSES TO PHQ QUESTIONS 1-9: 0
2. FEELING DOWN, DEPRESSED OR HOPELESS: 0
SUM OF ALL RESPONSES TO PHQ QUESTIONS 1-9: 0
SUM OF ALL RESPONSES TO PHQ QUESTIONS 1-9: 0

## 2023-07-11 NOTE — PROGRESS NOTES
1. \"Have you been to the ER, urgent care clinic since your last visit? Hospitalized since your last visit? \" no    2. \"Have you seen or consulted any other health care providers outside of the [address] [place] [address] since your last visit? \" no     3. For patients aged 43-73: Has the patient had a colonoscopy / FIT/ Cologuard?  Yes

## 2025-04-09 NOTE — PROGRESS NOTES
HISTORY OF PRESENT ILLNESS  Charles Gonsales is a 54 y.o. male.  HPI   Last here 7/11/23. Pt is here for routine care.      Pt notes he will be staying at the beach in [place] for the summer. Of note, he works from home.    He feels as though he has decreased hearing in his L ear, referred to ENT for hearing check     He also states he gets painful tingling in his R hand when he wakes up and throughout the night. The tingling is through all fingers, denies weakness. Discussed getting a wrist splint, he has been using an ace bandage to wrap his wrist which helps         Elevated blood pressure today   BP today is 145/80 will repeat 147/76   he is not on medication will need to start monitoring and return for nurse visit      Wt today is 173 lbs, up 9 lbs since lov   Pt  works out daily (lifting weights and running) and eats well   His wt is in the nl ranges        Reviewed labs  Ordered labs he will come back fasting     Previously pt wonders about risk of prostate cancer, especially since his dad had prostate cancer at age 63        Discussed elevated LDL, might need to start cholesterol meds in the future  Discussed this has potential to cause plaque buildup over time, discussed coronary cardiac scan, ordered       Pt follows with Dr Prescott (derm) for melanoma  Last visit was 2/25, f/u q6mo   Pt states they found melanoma in the past, states brislow scale was 7.39, had this removed and had incision surg in the past      Spends a lot of time during the summer at [facility]       Pt takes a daily multivitamin     States his brother was recently diagnosed with chf patient is quite worried about this wonders about what he can do to prevent developing heart failure himself will be getting CT coronary scan  Will get baseline EKG today -- nsr   Need to focus on good blood pressure control elevated here today          PREVENTIVE:   Colonoscopy: Dr. Medina 5/17/22, polyp neg for dysplasia, 5 yr repeat   PSA:

## 2025-04-16 ENCOUNTER — OFFICE VISIT (OUTPATIENT)
Age: 55
End: 2025-04-16
Payer: COMMERCIAL

## 2025-04-16 ENCOUNTER — RESULTS FOLLOW-UP (OUTPATIENT)
Age: 55
End: 2025-04-16

## 2025-04-16 VITALS
WEIGHT: 173.13 LBS | TEMPERATURE: 97 F | HEART RATE: 90 BPM | BODY MASS INDEX: 27.82 KG/M2 | SYSTOLIC BLOOD PRESSURE: 146 MMHG | HEIGHT: 66 IN | DIASTOLIC BLOOD PRESSURE: 73 MMHG | OXYGEN SATURATION: 97 %

## 2025-04-16 DIAGNOSIS — Z85.820 H/O MALIGNANT MELANOMA: ICD-10-CM

## 2025-04-16 DIAGNOSIS — H91.92 DECREASED HEARING OF LEFT EAR: ICD-10-CM

## 2025-04-16 DIAGNOSIS — Z00.00 PHYSICAL EXAM: Primary | ICD-10-CM

## 2025-04-16 DIAGNOSIS — Z82.49 FAMILY HISTORY OF CHF (CONGESTIVE HEART FAILURE): ICD-10-CM

## 2025-04-16 DIAGNOSIS — E78.5 DYSLIPIDEMIA: ICD-10-CM

## 2025-04-16 DIAGNOSIS — G56.01 CARPAL TUNNEL SYNDROME OF RIGHT WRIST: ICD-10-CM

## 2025-04-16 PROBLEM — C43.59 MALIGNANT MELANOMA OF TORSO EXCLUDING BREAST: Status: RESOLVED | Noted: 2019-11-25 | Resolved: 2025-04-16

## 2025-04-16 PROCEDURE — 99396 PREV VISIT EST AGE 40-64: CPT | Performed by: INTERNAL MEDICINE

## 2025-04-16 PROCEDURE — 93000 ELECTROCARDIOGRAM COMPLETE: CPT | Performed by: INTERNAL MEDICINE

## 2025-04-16 SDOH — ECONOMIC STABILITY: FOOD INSECURITY: WITHIN THE PAST 12 MONTHS, THE FOOD YOU BOUGHT JUST DIDN'T LAST AND YOU DIDN'T HAVE MONEY TO GET MORE.: NEVER TRUE

## 2025-04-16 SDOH — ECONOMIC STABILITY: FOOD INSECURITY: WITHIN THE PAST 12 MONTHS, YOU WORRIED THAT YOUR FOOD WOULD RUN OUT BEFORE YOU GOT MONEY TO BUY MORE.: NEVER TRUE

## 2025-04-16 ASSESSMENT — PATIENT HEALTH QUESTIONNAIRE - PHQ9
SUM OF ALL RESPONSES TO PHQ QUESTIONS 1-9: 0
SUM OF ALL RESPONSES TO PHQ QUESTIONS 1-9: 0
2. FEELING DOWN, DEPRESSED OR HOPELESS: NOT AT ALL
SUM OF ALL RESPONSES TO PHQ QUESTIONS 1-9: 0
SUM OF ALL RESPONSES TO PHQ QUESTIONS 1-9: 0
1. LITTLE INTEREST OR PLEASURE IN DOING THINGS: NOT AT ALL

## 2025-04-16 NOTE — PROGRESS NOTES
\"Have you been to the ER, urgent care clinic since your last visit?  Hospitalized since your last visit?\"    NO    “Have you seen or consulted any other health care providers outside our system since your last visit?”    Dermatology

## 2025-04-24 ENCOUNTER — CLINICAL SUPPORT (OUTPATIENT)
Age: 55
End: 2025-04-24

## 2025-04-24 VITALS — DIASTOLIC BLOOD PRESSURE: 73 MMHG | HEART RATE: 106 BPM | SYSTOLIC BLOOD PRESSURE: 109 MMHG

## 2025-04-24 DIAGNOSIS — Z01.30 BP CHECK: Primary | ICD-10-CM

## 2025-04-24 NOTE — PROGRESS NOTES
Pt presents in clinic for BP check per Dr. Zelaya.  BP taken--see VS.  BP (!) 141/73   Pulse (!) 106     /73   Pulse (!) 106     Pt informed Dr. Zelaya will be notified.    Pt informed if Dr. Zelaya makes any adjustments, pt will be contacted.   Pt verbalized understanding of information discussed w/ no further questions at this time.